# Patient Record
Sex: FEMALE | Race: WHITE | NOT HISPANIC OR LATINO | Employment: OTHER | URBAN - METROPOLITAN AREA
[De-identification: names, ages, dates, MRNs, and addresses within clinical notes are randomized per-mention and may not be internally consistent; named-entity substitution may affect disease eponyms.]

---

## 2023-05-09 ENCOUNTER — OFFICE VISIT (OUTPATIENT)
Dept: PRIMARY CARE | Facility: CLINIC | Age: 73
End: 2023-05-09
Payer: MEDICARE

## 2023-05-09 VITALS
DIASTOLIC BLOOD PRESSURE: 84 MMHG | WEIGHT: 113 LBS | TEMPERATURE: 97.6 F | HEART RATE: 82 BPM | OXYGEN SATURATION: 99 % | BODY MASS INDEX: 21.35 KG/M2 | SYSTOLIC BLOOD PRESSURE: 142 MMHG

## 2023-05-09 DIAGNOSIS — F51.01 PRIMARY INSOMNIA: Primary | ICD-10-CM

## 2023-05-09 DIAGNOSIS — M65.30 TRIGGER FINGER OF LEFT HAND, UNSPECIFIED FINGER: ICD-10-CM

## 2023-05-09 DIAGNOSIS — Z00.00 ROUTINE ADULT HEALTH MAINTENANCE: ICD-10-CM

## 2023-05-09 PROCEDURE — 99214 OFFICE O/P EST MOD 30 MIN: CPT | Performed by: FAMILY MEDICINE

## 2023-05-09 PROCEDURE — 1159F MED LIST DOCD IN RCRD: CPT | Performed by: FAMILY MEDICINE

## 2023-05-09 PROCEDURE — 1036F TOBACCO NON-USER: CPT | Performed by: FAMILY MEDICINE

## 2023-05-09 RX ORDER — IBUPROFEN 800 MG/1
800 TABLET ORAL 3 TIMES DAILY PRN
Qty: 60 TABLET | Refills: 0 | Status: SHIPPED | OUTPATIENT
Start: 2023-05-09 | End: 2023-07-08

## 2023-05-09 RX ORDER — ZOLPIDEM TARTRATE 10 MG/1
10 TABLET ORAL NIGHTLY PRN
Qty: 90 TABLET | Refills: 1 | Status: SHIPPED | OUTPATIENT
Start: 2023-05-09 | End: 2023-11-09 | Stop reason: SDUPTHER

## 2023-05-09 RX ORDER — ETODOLAC 400 MG/1
TABLET, FILM COATED ORAL
COMMUNITY
Start: 2021-11-24

## 2023-05-09 RX ORDER — ZOLPIDEM TARTRATE 10 MG/1
10 TABLET ORAL NIGHTLY PRN
COMMUNITY
End: 2023-05-09 | Stop reason: SDUPTHER

## 2023-05-09 NOTE — PROGRESS NOTES
"Good Subjective   Patient ID: Xi Boggs is a 73 y.o. female who presents for Hand Pain (R hand pain, fingers \"locking up\" x 2 months. NKI.).  Hand Pain      patient reports that her right hand middle and ring finger has been locking up for the past 2 months.  Gets stuck in place.  She has to manually open.  She has taken some ibuprofen at times but has not helped that much.    2.  Insomnia: Patient needs refills on her Ambien.  She is not totally yet but will be out in a couple weeks.  Blood work    No current outpatient medications on file prior to visit.     No current facility-administered medications on file prior to visit.        Vitals:    05/09/23 1533   BP: 142/84   Pulse: 82   Temp: 36.4 °C (97.6 °F)   SpO2: 99%       Review of Systems   All other systems reviewed and are negative.      Objective     Physical Exam  Constitutional:       Appearance: Normal appearance. She is well-developed.   HENT:      Head: Atraumatic.   Cardiovascular:      Rate and Rhythm: Normal rate and regular rhythm.      Heart sounds: Normal heart sounds. No murmur heard.  Pulmonary:      Effort: Pulmonary effort is normal.      Breath sounds: Normal breath sounds.   Abdominal:      General: Bowel sounds are normal.      Palpations: Abdomen is soft.   Skin:     General: Skin is warm.   Neurological:      General: No focal deficit present.      Mental Status: She is alert.   Psychiatric:         Mood and Affect: Mood normal.         No visits with results within 2 Month(s) from this visit.   Latest known visit with results is:   Legacy Encounter on 11/29/2021   Component Date Value Ref Range Status    Glucose 11/29/2021 119 (H)  74 - 99 mg/dL Final    Sodium 11/29/2021 142  136 - 145 mmol/L Final    Potassium 11/29/2021 4.0  3.5 - 5.3 mmol/L Final    Chloride 11/29/2021 107  98 - 107 mmol/L Final    Bicarbonate 11/29/2021 30  21 - 32 mmol/L Final    Anion Gap 11/29/2021 9 (L)  10 - 20 mmol/L Final    Urea Nitrogen 11/29/2021 23 "  6 - 23 mg/dL Final    Creatinine 11/29/2021 0.94  0.50 - 1.05 mg/dL Final    GLOMERULAR FILTRATION RATE-NON AFR* 11/29/2021 59 (A)  >60 mL/min/1.73m2 Final    GLOMERULAR FILTRATION RATE-* 11/29/2021 71  >60 mL/min/1.73m2 Final    Comment:  CALCULATIONS OF ESTIMATED GFR ARE PERFORMED   USING THE MDRD STUDY EQUATION FOR THE   IDMS-TRACEABLE CREATININE METHODS.   CLIN CHEM 2007;53:766-72      Calcium 11/29/2021 9.1  8.6 - 10.3 mg/dL Final       Assessment/Plan   Problem List Items Addressed This Visit    None  Visit Diagnoses       Primary insomnia    -  Primary    Relevant Medications    zolpidem (Ambien) 10 mg tablet    Other Relevant Orders    CBC and Auto Differential    Routine adult health maintenance        Relevant Orders    Lipid Panel    Comprehensive Metabolic Panel    CBC and Auto Differential    Trigger finger of left hand, unspecified finger        Relevant Medications    ibuprofen 800 mg tablet    Other Relevant Orders    Referral to Orthopaedic Surgery            Referred patient to Ortho hand.  Try high-dose ibuprofen for 2 weeks.  Refilled patient's Ambien.  Follow-up in 6 months for annual wellness.

## 2023-05-11 ENCOUNTER — APPOINTMENT (OUTPATIENT)
Dept: PRIMARY CARE | Facility: CLINIC | Age: 73
End: 2023-05-11
Payer: MEDICARE

## 2023-07-21 ENCOUNTER — TELEPHONE (OUTPATIENT)
Dept: PRIMARY CARE | Facility: CLINIC | Age: 73
End: 2023-07-21
Payer: MEDICARE

## 2023-07-25 ENCOUNTER — TELEPHONE (OUTPATIENT)
Dept: PRIMARY CARE | Facility: CLINIC | Age: 73
End: 2023-07-25
Payer: MEDICARE

## 2023-07-25 NOTE — TELEPHONE ENCOUNTER
Patient states her Ins co is telling her they will not cover her   zolpidem (Ambien) 10 mg tablet  because they need additional Info. ( Ins called this morning and hung up)

## 2023-07-26 DIAGNOSIS — F51.01 PRIMARY INSOMNIA: Primary | ICD-10-CM

## 2023-07-26 RX ORDER — DOXEPIN 6 MG/1
6 TABLET, FILM COATED ORAL NIGHTLY
Qty: 90 TABLET | Refills: 1 | Status: SHIPPED | OUTPATIENT
Start: 2023-07-26 | End: 2023-08-07

## 2023-07-27 ENCOUNTER — TELEPHONE (OUTPATIENT)
Dept: PRIMARY CARE | Facility: CLINIC | Age: 73
End: 2023-07-27
Payer: MEDICARE

## 2023-07-27 NOTE — TELEPHONE ENCOUNTER
Received notice that Zolpidem was denied by insurance, how would you like to proceed? Please advise, AM

## 2023-07-28 ENCOUNTER — TELEPHONE (OUTPATIENT)
Dept: PRIMARY CARE | Facility: CLINIC | Age: 73
End: 2023-07-28
Payer: MEDICARE

## 2023-07-28 NOTE — TELEPHONE ENCOUNTER
[facility] called states this medicine has reactions when taken together Please Advise  doxepin (Silenor) 6 mg tablet   zolpidem (Ambien) 10 mg tablet

## 2023-08-01 NOTE — TELEPHONE ENCOUNTER
Spoke with pt she said she already picked up the Ambien so she isnt sure why the pharmacy called she didn't need the Doxepin/ mk

## 2023-08-05 DIAGNOSIS — F51.01 PRIMARY INSOMNIA: ICD-10-CM

## 2023-08-07 ENCOUNTER — TELEPHONE (OUTPATIENT)
Dept: PRIMARY CARE | Facility: CLINIC | Age: 73
End: 2023-08-07
Payer: MEDICARE

## 2023-08-07 RX ORDER — DOXEPIN 6 MG/1
6 TABLET, FILM COATED ORAL NIGHTLY
Qty: 90 TABLET | Refills: 1 | Status: SHIPPED | OUTPATIENT
Start: 2023-08-07 | End: 2023-11-09 | Stop reason: ALTCHOICE

## 2023-10-05 ENCOUNTER — TELEPHONE (OUTPATIENT)
Dept: PRIMARY CARE | Facility: CLINIC | Age: 73
End: 2023-10-05
Payer: MEDICARE

## 2023-10-05 DIAGNOSIS — E78.9 LIPID DISORDER: ICD-10-CM

## 2023-10-05 DIAGNOSIS — Z00.00 ROUTINE ADULT HEALTH MAINTENANCE: ICD-10-CM

## 2023-10-05 DIAGNOSIS — Z12.31 ENCOUNTER FOR SCREENING MAMMOGRAM FOR MALIGNANT NEOPLASM OF BREAST: Primary | ICD-10-CM

## 2023-10-05 DIAGNOSIS — R92.8 ABNORMAL MAMMOGRAM: ICD-10-CM

## 2023-10-05 NOTE — TELEPHONE ENCOUNTER
Pt called in asking for an order for a mammogram? Pt states they always tell her that she has dense breast tissue and do an ultrasound, pt is asking if we can bypass that and just do an order for both? Pt also has an order for bloowork from a year ago she is asking for a new order for before her November appt? Please advise, AM

## 2023-10-11 ENCOUNTER — TELEPHONE (OUTPATIENT)
Dept: PRIMARY CARE | Facility: CLINIC | Age: 73
End: 2023-10-11
Payer: MEDICARE

## 2023-10-11 DIAGNOSIS — R92.8 ABNORMAL MAMMOGRAM: Primary | ICD-10-CM

## 2023-10-11 NOTE — TELEPHONE ENCOUNTER
Pt called and stated that her mammo needs to be a diagnostic mammogram and US please correct and call pt when done EY

## 2023-10-24 ENCOUNTER — HOSPITAL ENCOUNTER (OUTPATIENT)
Dept: RADIOLOGY | Facility: HOSPITAL | Age: 73
Discharge: HOME | End: 2023-10-24
Payer: MEDICARE

## 2023-10-24 DIAGNOSIS — R92.8 ABNORMAL FINDINGS ON DIAGNOSTIC IMAGING OF BREAST: Primary | ICD-10-CM

## 2023-10-24 DIAGNOSIS — R92.8 ABNORMAL MAMMOGRAM: ICD-10-CM

## 2023-10-24 DIAGNOSIS — Z00.00 ROUTINE ADULT HEALTH MAINTENANCE: ICD-10-CM

## 2023-10-24 DIAGNOSIS — Z12.31 ENCOUNTER FOR SCREENING MAMMOGRAM FOR MALIGNANT NEOPLASM OF BREAST: ICD-10-CM

## 2023-10-24 PROCEDURE — 76642 ULTRASOUND BREAST LIMITED: CPT | Mod: RT

## 2023-10-24 PROCEDURE — 77066 DX MAMMO INCL CAD BI: CPT | Performed by: RADIOLOGY

## 2023-10-24 PROCEDURE — 76642 ULTRASOUND BREAST LIMITED: CPT | Performed by: RADIOLOGY

## 2023-10-24 PROCEDURE — G0279 TOMOSYNTHESIS, MAMMO: HCPCS | Performed by: RADIOLOGY

## 2023-10-24 PROCEDURE — 77066 DX MAMMO INCL CAD BI: CPT
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
## 2023-10-24 NOTE — PROGRESS NOTES
Patient follow up scheduling:  Right breast US cyst aspiration/possible biopsy per provider recommendation 11/8 1015 AM per patient scheduling preferences. Order pending Dr. Chang signature.

## 2023-11-01 ENCOUNTER — APPOINTMENT (OUTPATIENT)
Dept: PRIMARY CARE | Facility: CLINIC | Age: 73
End: 2023-11-01
Payer: MEDICARE

## 2023-11-02 ENCOUNTER — TELEPHONE (OUTPATIENT)
Dept: RADIOLOGY | Facility: HOSPITAL | Age: 73
End: 2023-11-02
Payer: MEDICARE

## 2023-11-02 NOTE — TELEPHONE ENCOUNTER
Upon returning patient voicemail, this Navigator left message requesting call back and patient preferences for rescheduling. Patient voicemail noted only her request to reschedule her breast consultation with Dr. Chang (11/6) and US cyst aspiration (11/8) for after her spouse returns from out of town travel, however no rescheduling preferences were mentioned. Will await patient call back for further rescheduling.

## 2023-11-03 NOTE — TELEPHONE ENCOUNTER
Patient called back to this RN today with rescheduling preferences for week of 11/13. Dr. Chang office notified via voicemail regarding apt request, awaiting call back. Advised patient will call with apt information once secured.

## 2023-11-06 NOTE — TELEPHONE ENCOUNTER
Patient called back this AM confirming she will accept new consult with Dr. Chang 11/13 1515, US cyst aspiration 11/17 1015AM per her scheduling preferences in order for her spouse to accompany her to her visits. Patient demonstrates correct read back of appointment information after review, and denies further needs at this time.

## 2023-11-09 ENCOUNTER — OFFICE VISIT (OUTPATIENT)
Dept: PRIMARY CARE | Facility: CLINIC | Age: 73
End: 2023-11-09
Payer: MEDICARE

## 2023-11-09 VITALS
OXYGEN SATURATION: 97 % | HEART RATE: 68 BPM | WEIGHT: 112 LBS | TEMPERATURE: 97.1 F | DIASTOLIC BLOOD PRESSURE: 80 MMHG | HEIGHT: 60 IN | BODY MASS INDEX: 21.99 KG/M2 | SYSTOLIC BLOOD PRESSURE: 124 MMHG

## 2023-11-09 DIAGNOSIS — F51.01 PRIMARY INSOMNIA: ICD-10-CM

## 2023-11-09 DIAGNOSIS — D49.3 NEOPLASM OF BREAST: ICD-10-CM

## 2023-11-09 DIAGNOSIS — F41.9 ANXIETY: Primary | ICD-10-CM

## 2023-11-09 PROCEDURE — 1170F FXNL STATUS ASSESSED: CPT | Performed by: FAMILY MEDICINE

## 2023-11-09 PROCEDURE — 99214 OFFICE O/P EST MOD 30 MIN: CPT | Performed by: FAMILY MEDICINE

## 2023-11-09 PROCEDURE — G0439 PPPS, SUBSEQ VISIT: HCPCS | Performed by: FAMILY MEDICINE

## 2023-11-09 PROCEDURE — 1159F MED LIST DOCD IN RCRD: CPT | Performed by: FAMILY MEDICINE

## 2023-11-09 PROCEDURE — 1036F TOBACCO NON-USER: CPT | Performed by: FAMILY MEDICINE

## 2023-11-09 RX ORDER — ZOLPIDEM TARTRATE 10 MG/1
10 TABLET ORAL NIGHTLY PRN
Qty: 90 TABLET | Refills: 0 | Status: SHIPPED | OUTPATIENT
Start: 2024-02-29 | End: 2024-01-23 | Stop reason: SDUPTHER

## 2023-11-09 RX ORDER — LORAZEPAM 1 MG/1
1 TABLET ORAL 2 TIMES DAILY PRN
Qty: 2 TABLET | Refills: 0 | Status: SHIPPED | OUTPATIENT
Start: 2023-11-09 | End: 2024-02-07

## 2023-11-09 ASSESSMENT — ACTIVITIES OF DAILY LIVING (ADL)
BATHING: INDEPENDENT
GROCERY_SHOPPING: INDEPENDENT
MANAGING_FINANCES: INDEPENDENT
DRESSING: INDEPENDENT
TAKING_MEDICATION: INDEPENDENT
DOING_HOUSEWORK: INDEPENDENT

## 2023-11-09 ASSESSMENT — PATIENT HEALTH QUESTIONNAIRE - PHQ9
2. FEELING DOWN, DEPRESSED OR HOPELESS: NOT AT ALL
SUM OF ALL RESPONSES TO PHQ9 QUESTIONS 1 AND 2: 0
1. LITTLE INTEREST OR PLEASURE IN DOING THINGS: NOT AT ALL

## 2023-11-09 NOTE — PROGRESS NOTES
Subjective   Patient ID: Xi Boggs is a 73 y.o. female who presents for Medicare Annual Wellness Visit Subsequent.  HPI    HPI: Annual Wellness visit. The patient has not felt down, felt depressed, felt hopeless or had little interest or pleasure in doing things over the past 6 weeks. No trouble with bathing, dressing, eating, with mobility, toileting, house work, managing money, medications, shopping or transportation. Reports no falls. The home has grab bars in the bathroom and handrails on the stairs. The home does not have poor lighting or rugs in the hallway , clutter, uneven floors Denies hearing difficulty in the ears bilaterally. No diet restrictions.     Pt is worried about upcoming breast biopsy.  Generally sleeps well with zolpidem.    Preparing meals - independent  Using telephone - independent  Bladder - continent,  Bowel - continent    Opioid use - no,    Exercise -  yes,  walking occasionally  Diet - well balanced,     Pain score - zero    Providers  Zarina _ Jeff - 2018    Counseled pt on living will - printed out sheet for pt    CV screening: - negative    Colonoscopy: not due for another 4 years    Diabetes screening: checking    Glaucoma screen: seeing optho    CT chest tob screen: NA    Mammo: waiting for breast bx    DEXA: osteopenia in 2022    PAP/pelvis: declines    Vaccines:  encouraged flu and COVID vac    There is no problem list on file for this patient.      Social Connections: Not on file       Current Outpatient Medications on File Prior to Visit   Medication Sig Dispense Refill    etodolac (Lodine) 400 mg tablet Take by mouth.      zolpidem (Ambien) 10 mg tablet Take 1 tablet (10 mg) by mouth as needed at bedtime for sleep. 90 tablet 1    [DISCONTINUED] doxepin (Silenor) 6 mg tablet TAKE 1 TABLET BY MOUTH ONCE DAILY AT BEDTIME 90 tablet 1     No current facility-administered medications on file prior to visit.        Vitals:    11/09/23 1111   BP: 124/80   Pulse: 68   Temp:  36.2 °C (97.1 °F)   SpO2: 97%     Vitals:    11/09/23 1111   Weight: 50.8 kg (112 lb)       Review of Systems   All other systems reviewed and are negative.      Objective     Physical Exam  Vitals reviewed.   Constitutional:       General: She is not in acute distress.     Appearance: Normal appearance. She is well-developed. She is not diaphoretic.   HENT:      Head: Normocephalic and atraumatic.      Right Ear: Tympanic membrane normal.      Left Ear: Tympanic membrane normal.      Nose: Nose normal.      Mouth/Throat:      Mouth: Mucous membranes are moist.   Eyes:      Pupils: Pupils are equal, round, and reactive to light.   Cardiovascular:      Rate and Rhythm: Normal rate and regular rhythm.      Heart sounds: Normal heart sounds. No murmur heard.     No friction rub. No gallop.   Pulmonary:      Effort: Pulmonary effort is normal.      Breath sounds: Normal breath sounds. No rales.   Abdominal:      General: Bowel sounds are normal.      Palpations: Abdomen is soft.      Tenderness: There is no abdominal tenderness.   Musculoskeletal:      Cervical back: Normal range of motion and neck supple.   Skin:     General: Skin is warm and dry.   Neurological:      Mental Status: She is alert.   Psychiatric:         Mood and Affect: Mood normal.         No visits with results within 2 Month(s) from this visit.   Latest known visit with results is:   Legacy Encounter on 11/29/2021   Component Date Value Ref Range Status    Glucose 11/29/2021 119 (H)  74 - 99 mg/dL Final    Sodium 11/29/2021 142  136 - 145 mmol/L Final    Potassium 11/29/2021 4.0  3.5 - 5.3 mmol/L Final    Chloride 11/29/2021 107  98 - 107 mmol/L Final    Bicarbonate 11/29/2021 30  21 - 32 mmol/L Final    Anion Gap 11/29/2021 9 (L)  10 - 20 mmol/L Final    Urea Nitrogen 11/29/2021 23  6 - 23 mg/dL Final    Creatinine 11/29/2021 0.94  0.50 - 1.05 mg/dL Final    GLOMERULAR FILTRATION RATE-NON AFR* 11/29/2021 59 (A)  >60 mL/min/1.73m2 Final    GLOMERULAR  FILTRATION RATE-* 11/29/2021 71  >60 mL/min/1.73m2 Final    Comment:  CALCULATIONS OF ESTIMATED GFR ARE PERFORMED   USING THE MDRD STUDY EQUATION FOR THE   IDMS-TRACEABLE CREATININE METHODS.   CLIN CHEM 2007;53:766-72      Calcium 11/29/2021 9.1  8.6 - 10.3 mg/dL Final       Assessment/Plan   Problem List Items Addressed This Visit    None  Visit Diagnoses         Codes    Anxiety    -  Primary F41.9    Relevant Medications    LORazepam (Ativan) 1 mg tablet    Primary insomnia     F51.01    Neoplasm of breast     D49.3          Ativan to take before bx.  Refilled meds.  Fu in 6 mo

## 2023-11-13 ENCOUNTER — TELEPHONE (OUTPATIENT)
Dept: PRIMARY CARE | Facility: CLINIC | Age: 73
End: 2023-11-13
Payer: MEDICARE

## 2023-11-13 ENCOUNTER — TELEPHONE (OUTPATIENT)
Dept: RADIOLOGY | Facility: HOSPITAL | Age: 73
End: 2023-11-13
Payer: MEDICARE

## 2023-11-13 NOTE — TELEPHONE ENCOUNTER
Patient contacted this RN Navigator requesting to cancel US guided cyst aspiration/possible bx 11/17 1015 AM. Patient states spouse must travel out of town and wishes for him to be with her. She has already spoken to provider office to cancel consultation with Dr. Chang today. Patient declines to reschedule at this time, states she will call Monday to attempt reschedule. Advised patient provider apt/procedure availability may be limited related to the upcoming holiday, however this RN will assist when she calls back. Patient states understanding and wishes to proceed with cancellation at this time.   Will update Dr. Sna regarding patient follow up.

## 2023-11-14 NOTE — TELEPHONE ENCOUNTER
Called [facility], spoke to pharmacist [place] [facility] [place]

## 2024-01-23 DIAGNOSIS — F51.01 PRIMARY INSOMNIA: ICD-10-CM

## 2024-01-23 RX ORDER — ZOLPIDEM TARTRATE 10 MG/1
10 TABLET ORAL NIGHTLY PRN
Qty: 90 TABLET | Refills: 0 | Status: SHIPPED | OUTPATIENT
Start: 2024-02-29 | End: 2024-01-25 | Stop reason: SDUPTHER

## 2024-01-23 NOTE — TELEPHONE ENCOUNTER
Pt called rx line at [id number] requesting pended med    Pt states she leaves for [place] on Sat and needs rx before she leaves, next fill date 2/29...    Please advise  Next OV 5/23

## 2024-01-26 RX ORDER — ZOLPIDEM TARTRATE 10 MG/1
10 TABLET ORAL NIGHTLY PRN
Qty: 90 TABLET | Refills: 0 | Status: SHIPPED | OUTPATIENT
Start: 2024-01-26

## 2024-01-26 NOTE — TELEPHONE ENCOUNTER
Patient states she will have enough medicine until she gets back from [place] will re order it then.

## 2024-05-23 ENCOUNTER — APPOINTMENT (OUTPATIENT)
Dept: PRIMARY CARE | Facility: CLINIC | Age: 74
End: 2024-05-23
Payer: MEDICARE